# Patient Record
Sex: MALE | Race: WHITE | NOT HISPANIC OR LATINO | Employment: OTHER | URBAN - METROPOLITAN AREA
[De-identification: names, ages, dates, MRNs, and addresses within clinical notes are randomized per-mention and may not be internally consistent; named-entity substitution may affect disease eponyms.]

---

## 2017-01-09 ENCOUNTER — TRANSFERRED RECORDS (OUTPATIENT)
Dept: HEALTH INFORMATION MANAGEMENT | Facility: CLINIC | Age: 37
End: 2017-01-09

## 2017-02-27 ENCOUNTER — TRANSFERRED RECORDS (OUTPATIENT)
Dept: HEALTH INFORMATION MANAGEMENT | Facility: CLINIC | Age: 37
End: 2017-02-27

## 2017-03-01 ENCOUNTER — TELEPHONE (OUTPATIENT)
Dept: TRANSPLANT | Facility: CLINIC | Age: 37
End: 2017-03-01

## 2017-03-01 NOTE — TELEPHONE ENCOUNTER
Intake Progress Note    Organ:  Lung    Initial Call Made by:  Fax from Dr Misha Park    Referring Physician: Dr Misha Park    Assigned Coordinator:  Chrissy Ignacio    Reported Diagnosis: Cystic fibrosis     Preferred Evaluation Start Date:  ASAP     Best time patient can be reached:    Type of packet sent:  Lung packet sent by Makenzie Eagle on Thursday, March 2, 2017    If no PCP or referring information the time of call informed pt to call back with information: Y or N    Informed patient to call if doesn't receive packet and or phone call from coordinator within given time    Emailed fax to Chrissy Ignacio     Requested records from [facility] from Dr Misha Park and Dr Alka Hayden - 03/02/2017

## 2017-03-01 NOTE — Clinical Note
Chrissy, sent the first one too early- pts previous inpt stay on 8/3/16- 8/5/16 and on 2/18-16- 3/2/16 for CF exacerbation, MRSA pneumonia, COPD. Will req additional rec as needed.

## 2017-03-03 ENCOUNTER — MEDICAL CORRESPONDENCE (OUTPATIENT)
Dept: TRANSPLANT | Facility: CLINIC | Age: 37
End: 2017-03-03

## 2017-03-08 NOTE — TELEPHONE ENCOUNTER
"     SOT LUNG INTAKE    March 8, 2017    Erik Monroy  [id number]  There is no height or weight on file to calculate BMI.   Ht 5'5\"  Wt 192  BMI 29.1  Referring Provider: Dr. Misha Park (Pulmonary), Dr. Alka Aguilar (PCP)  Diagnosis: CF, Pancreatic exocrine insufficiency, COPD  Source/Facility: [facility]    History:  Smoking/nicotine use history: chewing tobacco, as noted smokeless tobacco use.  Alcohol use history: 1 case per day for 3 days, previously stopped drinking but using oxycodone and ativan (per Epic hx)  Drug use history: past marijuana noted  Cancer history: none noted  Cardiac history: none noted    Family History and Social History were completed.    Primary Care  Colonoscopy: n/a  Dental: dental appt scheduled  Hepatitis A/B: none seen  Pneumovax: updated in Epic    Pulmonary Tests   PFTs: [facility]  2/23/15, in [facility]  6 Minute Walk: none seen  Sleep Study: none seen    Diagnostic Tests/Imaging  Heart cath: none seen  Stress Test: none seen  ECHO: none seen    Chest CT: [facility]  11/22/16, in Epic  DEXA: n/a  Upper GI: [facility]- esophageal biopsy  1/6/16  Esophagus, biopsy: Benign squamous mucosa with sparse  Intraepithelial lymphocytes and focal areas of up to 10 eosinophils per  high power field.  PATHOLOGIST: RHONA LOZANO MD    Notes: Clinic notes on 2/27/17- recent inpt stay in [place] for CF exacerbation, sputum culture was positive for pseudomonas, hx of MRSA.  Rx orkambi, started stiolto, qd azithromycin, pneumatic vest.  As noted, previous inpt stay on 8/3/16- 8/5/16 and on 2/18-16- 3/2/16 for CF exacerbation, MRSA pneumonia, COPD.  Plan to continue his pneumatic vest, Orkambi therapy, albuterol and mucomyst nebulizer including Stiolto.  Pancreatic exocirne and endocrine insufficiency, to continue his Creon supplements.  Gynocomastia- left breat biopsy: no atypia or malignancy.  PATHOLOGIST: DEVANTE YOON MD  1/07/2016    Intake from [facility], unable to " contact pt, will req additional med rec as needed.

## 2017-03-21 ENCOUNTER — TELEPHONE (OUTPATIENT)
Dept: TRANSPLANT | Facility: CLINIC | Age: 37
End: 2017-03-21

## 2017-03-21 NOTE — TELEPHONE ENCOUNTER
Called patient and completed intake note for lung transplant consideration.  Patient used nicotine lozenges as of one month ago, although he wasn't 100% sure when he last used.  Will work with [facility] to re-establish care.

## 2019-10-20 ENCOUNTER — TRANSFERRED RECORDS (OUTPATIENT)
Dept: HEALTH INFORMATION MANAGEMENT | Facility: CLINIC | Age: 39
End: 2019-10-20

## 2019-12-16 ENCOUNTER — TRANSFERRED RECORDS (OUTPATIENT)
Dept: HEALTH INFORMATION MANAGEMENT | Facility: CLINIC | Age: 39
End: 2019-12-16

## 2019-12-18 ENCOUNTER — TRANSFERRED RECORDS (OUTPATIENT)
Dept: HEALTH INFORMATION MANAGEMENT | Facility: CLINIC | Age: 39
End: 2019-12-18

## 2020-01-03 ENCOUNTER — TRANSFERRED RECORDS (OUTPATIENT)
Dept: HEALTH INFORMATION MANAGEMENT | Facility: CLINIC | Age: 40
End: 2020-01-03

## 2020-01-24 ENCOUNTER — TRANSFERRED RECORDS (OUTPATIENT)
Dept: HEALTH INFORMATION MANAGEMENT | Facility: CLINIC | Age: 40
End: 2020-01-24

## 2020-01-24 ENCOUNTER — MEDICAL CORRESPONDENCE (OUTPATIENT)
Dept: HEALTH INFORMATION MANAGEMENT | Facility: CLINIC | Age: 40
End: 2020-01-24

## 2020-02-11 ENCOUNTER — DOCUMENTATION ONLY (OUTPATIENT)
Dept: TRANSPLANT | Facility: CLINIC | Age: 40
End: 2020-02-11

## 2020-02-28 ENCOUNTER — TELEPHONE (OUTPATIENT)
Dept: TRANSPLANT | Facility: CLINIC | Age: 40
End: 2020-02-28

## 2020-02-28 NOTE — TELEPHONE ENCOUNTER
Refer to Lung Intake note for patient history with referral for consideration of lung transplantation.    Intake call completed and patient with several self care issues that need addressing by patient.    Poor compliance per notes from his PCP.  Continues to use chewing tobacco daily  Poor BS control and checking BS only once dialy.  BMI 34 and will need BMI less than 30 for listing  Vesting only once daily and lack of exercise    Reviewed these topics with patient and he is in agreement to work on each of the following self improvement issues with his PCP and will call transplant team in 3 months with update on his progress. If ongoing progress is being made in these categories we can move ahead with NPT visit and would recommend assigning to Dr. Gonzalez.